# Patient Record
Sex: FEMALE | Employment: FULL TIME | URBAN - METROPOLITAN AREA
[De-identification: names, ages, dates, MRNs, and addresses within clinical notes are randomized per-mention and may not be internally consistent; named-entity substitution may affect disease eponyms.]

---

## 2022-02-24 ENCOUNTER — OFFICE VISIT (OUTPATIENT)
Dept: NEUROLOGY | Age: 62
End: 2022-02-24
Payer: COMMERCIAL

## 2022-02-24 VITALS
WEIGHT: 169 LBS | TEMPERATURE: 97 F | BODY MASS INDEX: 28.16 KG/M2 | HEART RATE: 76 BPM | SYSTOLIC BLOOD PRESSURE: 153 MMHG | HEIGHT: 65 IN | DIASTOLIC BLOOD PRESSURE: 98 MMHG

## 2022-02-24 DIAGNOSIS — R56.9 SEIZURE-LIKE ACTIVITY (HCC): Primary | ICD-10-CM

## 2022-02-24 PROCEDURE — 99204 OFFICE O/P NEW MOD 45 MIN: CPT | Performed by: STUDENT IN AN ORGANIZED HEALTH CARE EDUCATION/TRAINING PROGRAM

## 2022-02-24 RX ORDER — PAROXETINE HYDROCHLORIDE 20 MG/1
20 TABLET, FILM COATED ORAL DAILY
COMMUNITY
Start: 2022-01-31

## 2022-02-24 RX ORDER — LORAZEPAM 0.5 MG/1
0.5 TABLET ORAL PRN
COMMUNITY

## 2022-02-24 RX ORDER — ATENOLOL 25 MG/1
25 TABLET ORAL DAILY
COMMUNITY
Start: 2022-01-27

## 2022-02-24 NOTE — PROGRESS NOTES
[facility] NEUROLOGY SPECIALIST  [address]  [id number]  Dept: [phone]    PATIENT NAME: Marquise Mota  PATIENT MRN: [id number]  PRIMARY CARE PHYSICIAN: Sophia Bui MD    HPI:      Marquise Mota is a 62 y.o. female who presents to clinic today for evaluation of Headache     Headaches started almost 3 years ago. She has a history of head trauma where she tripped and hit her head on concrete. No Loc. She describes her headache as a sensation of an internal zapping that can occurs intermittently throughout the day. It is very brief. It is more prominent when she turns her head. It occurs once a week. Denies any associated vertigo, tinnitus, nausea, focal weakness, visual symptoms, or paresthesias. No aura prior to the episode. Denies an olfactory aura, automatisms or limb jerking activity. No history of seizures or stroke. History of:  Family history of headaches or migraines: no  Renal stones: no   Motion sickness:  no  Head/Neck Trauma: yes   Stressors: yes   Sleep: 1-4 times nocturia without difficulty falling back asleep, does snore    Headache Medications  Current abortive meds:none  Current prophylactic meds:none  Previous abortive medications tried:none  Previous prophylactic medications tried:none       Previous Diagnostic Labs, Tests and Imaging:    MRI Brain:                                              MRI Brain without co 2019  IMPRESSION:     1. Minimal microvascular ischemic changes otherwise no acute abnormality. PREVIOUS WORKUP:     No results found for: WBC, HGB, HCT, MCV, PLT    Past Medical History:   Diagnosis Date    Colitis     Headache         History reviewed. No pertinent surgical history.      Social History     Socioeconomic History    Marital status: Single     Spouse name: Not on file    Number of children: Not on file    Years of education: Not on file    Highest education level: Not on file   Occupational History    Not on file   Tobacco Use  Smoking status: Current Some Day Smoker    Smokeless tobacco: Never Used   Vaping Use    Vaping Use: Never used   Substance and Sexual Activity    Alcohol use: Yes     Comment: social    Drug use: Never    Sexual activity: Not on file   Other Topics Concern    Not on file   Social History Narrative    Not on file     Social Determinants of Health     Financial Resource Strain:     Difficulty of Paying Living Expenses: Not on file   Food Insecurity:     Worried About Running Out of Food in the Last Year: Not on file    Letha of Food in the Last Year: Not on file   Transportation Needs:     Lack of Transportation (Medical): Not on file    Lack of Transportation (Non-Medical): Not on file   Physical Activity:     Days of Exercise per Week: Not on file    Minutes of Exercise per Session: Not on file   Stress:     Feeling of Stress : Not on file   Social Connections:     Frequency of Communication with Friends and Family: Not on file    Frequency of Social Gatherings with Friends and Family: Not on file    Attends Islam Services: Not on file    Active Member of [address] [place] [address] or Organizations: Not on file    Attends Club or Organization Meetings: Not on file    Marital Status: Not on file   Intimate Partner Violence:     Fear of Current or Ex-Partner: Not on file    Emotionally Abused: Not on file    Physically Abused: Not on file    Sexually Abused: Not on file   Housing Stability:     Unable to Pay for Housing in the Last Year: Not on file    Number of [place] in the Last Year: Not on file    Unstable Housing in the Last Year: Not on file        Current Outpatient Medications   Medication Sig Dispense Refill    PARoxetine (PAXIL) 20 MG tablet Take 20 mg by mouth daily      atenolol (TENORMIN) 25 MG tablet Take 25 mg by mouth daily      LORazepam (ATIVAN) 0.5 MG tablet Take 0.5 mg by mouth as needed. No current facility-administered medications for this visit.         Allergies Allergen Reactions    Sulfa Antibiotics         REVIEW OF SYSTEMS:     Review of Systems   Constitutional: Negative. HENT: Negative. Eyes: Negative. Respiratory: Negative. Cardiovascular: Negative. Gastrointestinal: Negative. Endocrine: Negative. Genitourinary: Negative. Musculoskeletal: Negative. Skin: Negative. Neurological: Positive for headaches. Negative for dizziness, tremors, seizures, syncope, facial asymmetry, speech difficulty, weakness, light-headedness and numbness. Hematological: Negative. Psychiatric/Behavioral: Negative for sleep disturbance. VITALS  BP (!) 153/98 (Site: Left Upper Arm, Position: Sitting, Cuff Size: Medium Adult)   Pulse 76   Temp 97 °F (36.1 °C)   Ht 5' 5\" (1.651 m)   Wt 169 lb (76.7 kg)   BMI 28.12 kg/m²          NEUROLOGICAL EXAMINATION:         Mental status    Alert and oriented x 3; intact memory with no confusion, speech or language problems; no hallucinations or delusions  Fund of information appropriate for level of education    Cranial nerves    II - visual fields intact to confrontation  III, IV, VI  extra-ocular muscles full: no pupillary defect; no NELY, no nystagmus, no ptosis   V - normal facial sensation                                                               VII - normal facial symmetry                                                             VIII - intact hearing                                                                             IX, X - symmetrical palate                                                                  XI - symmetrical shoulder shrug                                                       XII - tongue midline without atrophy or fasciculation      Motor function  Normal muscle bulk and tone; strength 5/5 on all 4 extremities, no pronator drift      Sensory function Intact to light touch, pinprick, vibration, proprioception on all 4 extremities      Cerebellar Intact fine motor movement. No involuntary movements or tremors. No ataxia or dysmetria on finger to nose or heel to shin testing      Reflex function DTR 2+ on bilateral UE and LE, symmetric. Negative Babinski      Gait                   normal base and arm swing        ASSESSMENT / PLAN:     This is a 62 yr old F who presents for evaluation of a headache. Patient describes a shock like or zapping sensation that occurs internally and is very brief. It can occur intermittently throughout the day and occurs about once a week for the past few years. Denies any aura or associated symptoms. Differential includes headache but will also evaluate for epileptic activity with an EEG. MRI brain in 2019 just showed microvascular disease. She does not want to be on any abortive or prophylactic medications for a headache. Ms. Charla Rogers received counseling on the following healthy behaviors: medical compliance, smoking cessation, blood pressure control, regular follow up with primary doctor.         Electronically signed by Purvi Berger MD on 2/27/2022 at 10:13 AM

## 2022-02-27 ASSESSMENT — ENCOUNTER SYMPTOMS
RESPIRATORY NEGATIVE: 1
EYES NEGATIVE: 1
GASTROINTESTINAL NEGATIVE: 1

## 2022-03-31 ENCOUNTER — TELEPHONE (OUTPATIENT)
Dept: NEUROLOGY | Age: 62
End: 2022-03-31

## 2022-03-31 NOTE — TELEPHONE ENCOUNTER
Dev Aceves called stating that for financial reasons she has to reschedule her EEG until some time in the fall. She is asking if she can wait to follow up until after she gets the EEG of if she should keep her 4/6 appointment. Please advise.